# Patient Record
Sex: FEMALE | Race: ASIAN | NOT HISPANIC OR LATINO | URBAN - METROPOLITAN AREA
[De-identification: names, ages, dates, MRNs, and addresses within clinical notes are randomized per-mention and may not be internally consistent; named-entity substitution may affect disease eponyms.]

---

## 2017-03-28 ENCOUNTER — OFFICE (OUTPATIENT)
Dept: URBAN - METROPOLITAN AREA CLINIC 78 | Facility: CLINIC | Age: 72
End: 2017-03-28
Payer: MEDICAID

## 2017-03-28 VITALS
HEART RATE: 89 BPM | TEMPERATURE: 97.9 F | HEIGHT: 63 IN | WEIGHT: 121 LBS | SYSTOLIC BLOOD PRESSURE: 169 MMHG | DIASTOLIC BLOOD PRESSURE: 72 MMHG

## 2017-03-28 DIAGNOSIS — K75.4 AUTOIMMUNE HEPATITIS: ICD-10-CM

## 2017-03-28 DIAGNOSIS — K76.0 FATTY (CHANGE OF) LIVER, NOT ELSEWHERE CLASSIFIED: ICD-10-CM

## 2017-03-28 DIAGNOSIS — K31.89 OTHER DISEASES OF STOMACH AND DUODENUM: ICD-10-CM

## 2017-03-28 DIAGNOSIS — Z12.11 ENCOUNTER FOR SCREENING FOR MALIGNANT NEOPLASM OF COLON: ICD-10-CM

## 2017-03-28 DIAGNOSIS — I85.00 ESOPHAGEAL VARICES WITHOUT BLEEDING: ICD-10-CM

## 2017-03-28 PROCEDURE — 99214 OFFICE O/P EST MOD 30 MIN: CPT

## 2017-03-28 NOTE — SERVICEHPINOTES
Mrs Mullins returns for followup. She has trouble sleeping but not klaus encephalopathic disturbances. She denies abdominal pain. She has been generally well otherwise. Denies any bleeding or jaundice. She has not had a colonoscopy and EGD recently since 2008 revealing portal gastropathy and varices which was banded for bleeding. Repeat EGD and colonoscopy ordered last year but patient did not schedule. Currently on AZA for autoimmune hepatitis and Lactulose 90mL daily and has about 3 bms to 4 BMs per day.   Last US with Doppler in November was stable. Labs from November also stable.    Dr. Garza had also recommended that she see a hepatologist to discuss possible transplant candidacy. Patient states she did this and was not a candidate for transplant (cannot remember who she saw). No other GI related complaints today.

## 2017-04-18 LAB
AFP, SERUM, TUMOR MARKER: 1.4 NG/ML (ref 0–8.3)
AMBIG ABBREV CMP14 DEFAULT: (no result)
AMMONIA, PLASMA: 53 UG/DL (ref 19–87)
CBC WITH DIFFERENTIAL/PLATELET: BASO (ABSOLUTE): 0 X10E3/UL (ref 0–0.2)
CBC WITH DIFFERENTIAL/PLATELET: BASOS: 0 %
CBC WITH DIFFERENTIAL/PLATELET: EOS (ABSOLUTE): 0.1 X10E3/UL (ref 0–0.4)
CBC WITH DIFFERENTIAL/PLATELET: EOS: 4 %
CBC WITH DIFFERENTIAL/PLATELET: HEMATOCRIT: 36.9 % (ref 34–46.6)
CBC WITH DIFFERENTIAL/PLATELET: HEMATOLOGY COMMENTS: (no result)
CBC WITH DIFFERENTIAL/PLATELET: HEMOGLOBIN: 12.6 G/DL (ref 11.1–15.9)
CBC WITH DIFFERENTIAL/PLATELET: IMMATURE CELLS: (no result)
CBC WITH DIFFERENTIAL/PLATELET: IMMATURE GRANS (ABS): 0 X10E3/UL (ref 0–0.1)
CBC WITH DIFFERENTIAL/PLATELET: IMMATURE GRANULOCYTES: 0 %
CBC WITH DIFFERENTIAL/PLATELET: LYMPHS (ABSOLUTE): 0.8 X10E3/UL (ref 0.7–3.1)
CBC WITH DIFFERENTIAL/PLATELET: LYMPHS: 28 %
CBC WITH DIFFERENTIAL/PLATELET: MCH: 35.5 PG — HIGH (ref 26.6–33)
CBC WITH DIFFERENTIAL/PLATELET: MCHC: 34.1 G/DL (ref 31.5–35.7)
CBC WITH DIFFERENTIAL/PLATELET: MCV: 104 FL — HIGH (ref 79–97)
CBC WITH DIFFERENTIAL/PLATELET: MONOCYTES(ABSOLUTE): 0.2 X10E3/UL (ref 0.1–0.9)
CBC WITH DIFFERENTIAL/PLATELET: MONOCYTES: 6 %
CBC WITH DIFFERENTIAL/PLATELET: NEUTROPHILS (ABSOLUTE): 1.7 X10E3/UL (ref 1.4–7)
CBC WITH DIFFERENTIAL/PLATELET: NEUTROPHILS: 62 %
CBC WITH DIFFERENTIAL/PLATELET: NRBC: (no result)
CBC WITH DIFFERENTIAL/PLATELET: PLATELETS: 128 X10E3/UL — LOW (ref 150–379)
CBC WITH DIFFERENTIAL/PLATELET: RBC: 3.55 X10E6/UL — LOW (ref 3.77–5.28)
CBC WITH DIFFERENTIAL/PLATELET: RDW: 16.1 % — HIGH (ref 12.3–15.4)
CBC WITH DIFFERENTIAL/PLATELET: WBC: 2.8 X10E3/UL — LOW (ref 3.4–10.8)
COMP. METABOLIC PANEL (14): A/G RATIO: 1.5 (ref 1.2–2.2)
COMP. METABOLIC PANEL (14): ALBUMIN, SERUM: 3.5 G/DL (ref 3.5–4.8)
COMP. METABOLIC PANEL (14): ALKALINE PHOSPHATASE, S: 113 IU/L (ref 39–117)
COMP. METABOLIC PANEL (14): ALT (SGPT): 11 IU/L (ref 0–32)
COMP. METABOLIC PANEL (14): AST (SGOT): 22 IU/L (ref 0–40)
COMP. METABOLIC PANEL (14): BILIRUBIN, TOTAL: 1.3 MG/DL — HIGH (ref 0–1.2)
COMP. METABOLIC PANEL (14): BUN/CREATININE RATIO: 18 (ref 12–28)
COMP. METABOLIC PANEL (14): BUN: 12 MG/DL (ref 8–27)
COMP. METABOLIC PANEL (14): CALCIUM, SERUM: 9 MG/DL (ref 8.7–10.3)
COMP. METABOLIC PANEL (14): CARBON DIOXIDE, TOTAL: 26 MMOL/L (ref 18–29)
COMP. METABOLIC PANEL (14): CHLORIDE, SERUM: 106 MMOL/L (ref 96–106)
COMP. METABOLIC PANEL (14): CREATININE, SERUM: 0.68 MG/DL (ref 0.57–1)
COMP. METABOLIC PANEL (14): EGFR IF AFRICN AM: 100 ML/MIN/1.73 (ref 59–?)
COMP. METABOLIC PANEL (14): EGFR IF NONAFRICN AM: 86 ML/MIN/1.73 (ref 59–?)
COMP. METABOLIC PANEL (14): GLOBULIN, TOTAL: 2.4 G/DL (ref 1.5–4.5)
COMP. METABOLIC PANEL (14): GLUCOSE, SERUM: 94 MG/DL (ref 65–99)
COMP. METABOLIC PANEL (14): POTASSIUM, SERUM: 4.1 MMOL/L (ref 3.5–5.2)
COMP. METABOLIC PANEL (14): PROTEIN, TOTAL, SERUM: 5.9 G/DL — LOW (ref 6–8.5)
COMP. METABOLIC PANEL (14): SODIUM, SERUM: 145 MMOL/L — HIGH (ref 134–144)
PROTHROMBIN TIME (PT): INR: 1.1 (ref 0.8–1.2)
PROTHROMBIN TIME (PT): PROTHROMBIN TIME: 11.5 SEC (ref 9.1–12)

## 2017-04-25 ENCOUNTER — ON CAMPUS - OUTPATIENT (OUTPATIENT)
Dept: URBAN - METROPOLITAN AREA HOSPITAL 14 | Facility: HOSPITAL | Age: 72
End: 2017-04-25
Payer: MEDICAID

## 2017-04-25 DIAGNOSIS — K63.5 POLYP OF COLON: ICD-10-CM

## 2017-04-25 DIAGNOSIS — Z12.11 ENCOUNTER FOR SCREENING FOR MALIGNANT NEOPLASM OF COLON: ICD-10-CM

## 2017-04-25 PROCEDURE — 45380 COLONOSCOPY AND BIOPSY: CPT

## 2017-05-04 ENCOUNTER — ON CAMPUS - OUTPATIENT (OUTPATIENT)
Dept: URBAN - METROPOLITAN AREA HOSPITAL 14 | Facility: HOSPITAL | Age: 72
End: 2017-05-04
Payer: MEDICAID

## 2017-05-04 DIAGNOSIS — K74.69 OTHER CIRRHOSIS OF LIVER: ICD-10-CM

## 2017-05-04 DIAGNOSIS — K75.4 AUTOIMMUNE HEPATITIS: ICD-10-CM

## 2017-05-04 DIAGNOSIS — K31.89 OTHER DISEASES OF STOMACH AND DUODENUM: ICD-10-CM

## 2017-05-04 PROCEDURE — 43239 EGD BIOPSY SINGLE/MULTIPLE: CPT

## 2018-05-02 ENCOUNTER — OFFICE (OUTPATIENT)
Dept: URBAN - METROPOLITAN AREA CLINIC 78 | Facility: CLINIC | Age: 73
End: 2018-05-02
Payer: MEDICAID

## 2018-05-02 VITALS
HEIGHT: 63 IN | SYSTOLIC BLOOD PRESSURE: 184 MMHG | WEIGHT: 124 LBS | DIASTOLIC BLOOD PRESSURE: 82 MMHG | HEART RATE: 85 BPM | TEMPERATURE: 97.8 F

## 2018-05-02 DIAGNOSIS — I85.00 ESOPHAGEAL VARICES WITHOUT BLEEDING: ICD-10-CM

## 2018-05-02 DIAGNOSIS — K74.60 UNSPECIFIED CIRRHOSIS OF LIVER: ICD-10-CM

## 2018-05-02 DIAGNOSIS — K31.89 OTHER DISEASES OF STOMACH AND DUODENUM: ICD-10-CM

## 2018-05-02 DIAGNOSIS — K75.4 AUTOIMMUNE HEPATITIS: ICD-10-CM

## 2018-05-02 LAB
AFP, SERUM, TUMOR MARKER: 1 NG/ML (ref 0–8.3)
AMMONIA, PLASMA: 45 UG/DL (ref 19–87)

## 2018-05-02 PROCEDURE — 99214 OFFICE O/P EST MOD 30 MIN: CPT

## 2018-05-02 RX ORDER — AZATHIOPRINE 50 MG/1
TABLET ORAL
Qty: 30 | Refills: 12 | Status: COMPLETED
End: 2018-07-25

## 2018-05-02 RX ORDER — LACTULOSE 10 G/15ML
SOLUTION ORAL
Qty: 8100 | Refills: 12 | Status: ACTIVE

## 2018-05-02 RX ORDER — URSODIOL 300 MG/1
CAPSULE ORAL
Qty: 90 | Refills: 0 | Status: COMPLETED
End: 2021-02-10

## 2018-05-02 RX ORDER — PANTOPRAZOLE SODIUM 20 MG/1
TABLET, DELAYED RELEASE ORAL
Qty: 30 | Refills: 0 | Status: COMPLETED
End: 2021-02-10

## 2018-05-02 NOTE — SERVICEHPINOTES
BUDDY WILKES   is a   73  female who presents for cirrhosis f/u.She has trouble sleeping but not klaus encephalopathic disturbances. She denies abdominal pain. She has been generally well otherwise. Denies any bleeding or jaundice. She had a colonoscopy and EGD last year. S/p esophageal banding in 2008. EGD 5/2017 revealed esophageal ring s/p dilation, mild hypertensive portal gatropathy, and normal duodenum. Colonoscopy 4/2017 revealed 2 hyperplastic polyps. BRCurrently on AZA for autoimmune hepatitis and Lactulose 90mL daily and has about 5 BMs daily. Last US with Doppler was in November 2016, it was stable. She has not followed up with a repeat US yet, does not know why. Recent CBC, CMP, and INR were stable (4/27/18), repeat in 6 months? Dr. Garza had also recommended that she see a hepatologist to discuss possible transplant candidacy. Patient states she did this and was not a candidate for transplant (cannot remember who she saw). No other GI related complaints today.

## 2018-07-25 ENCOUNTER — OFFICE (OUTPATIENT)
Dept: URBAN - METROPOLITAN AREA CLINIC 78 | Facility: CLINIC | Age: 73
End: 2018-07-25
Payer: MEDICAID

## 2018-07-25 VITALS
TEMPERATURE: 97.9 F | DIASTOLIC BLOOD PRESSURE: 80 MMHG | WEIGHT: 128 LBS | SYSTOLIC BLOOD PRESSURE: 173 MMHG | HEART RATE: 85 BPM | HEIGHT: 63 IN

## 2018-07-25 DIAGNOSIS — K74.60 UNSPECIFIED CIRRHOSIS OF LIVER: ICD-10-CM

## 2018-07-25 DIAGNOSIS — I85.00 ESOPHAGEAL VARICES WITHOUT BLEEDING: ICD-10-CM

## 2018-07-25 DIAGNOSIS — K75.4 AUTOIMMUNE HEPATITIS: ICD-10-CM

## 2018-07-25 DIAGNOSIS — K31.89 OTHER DISEASES OF STOMACH AND DUODENUM: ICD-10-CM

## 2018-07-25 PROCEDURE — 99214 OFFICE O/P EST MOD 30 MIN: CPT

## 2018-07-25 NOTE — SERVICEHPINOTES
BUDDY WILKES   is a   73  female who presents for f/u of US with Doppler. US with Doppler done on 7/3/18 revealed decreased velocities within the TIPS stent compatible with mild-moderate stenosis although still patent. BRToday, her only complaint is difficulty sleeping and she plans to f/u with PCP about this. No abdominal pain, weight gain. Weight has been pretty stable. No other issues.

## 2021-02-10 ENCOUNTER — OFFICE (OUTPATIENT)
Dept: URBAN - METROPOLITAN AREA CLINIC 79 | Facility: CLINIC | Age: 76
End: 2021-02-10
Payer: MEDICAID

## 2021-02-10 VITALS
HEIGHT: 63 IN | DIASTOLIC BLOOD PRESSURE: 61 MMHG | HEART RATE: 95 BPM | TEMPERATURE: 97 F | SYSTOLIC BLOOD PRESSURE: 165 MMHG | WEIGHT: 124 LBS

## 2021-02-10 DIAGNOSIS — K75.4 AUTOIMMUNE HEPATITIS: ICD-10-CM

## 2021-02-10 DIAGNOSIS — K74.60 UNSPECIFIED CIRRHOSIS OF LIVER: ICD-10-CM

## 2021-02-10 PROCEDURE — 99214 OFFICE O/P EST MOD 30 MIN: CPT | Performed by: PHYSICIAN ASSISTANT

## 2021-02-10 NOTE — SERVICEHPINOTES
74 yo female presents with her son for f/u AIH, cirrhosis. She is s/p TIPS in remote past. Hasn't been seen by myself for many years and hasn't been seen here since 2018 due to insurance challenges. She has, however, been continuing to get labs and U/S with TIPS assessment q 6 months and will be going for both of these items within the next week. Patient's main concern today is regarding whether to get the COVID vaccine or not. She is otherwise doing well overall. Denies any problems with confusion or memory loss. Has occasional LE edema if she's on her feet a lot. Denies abdominal swelling. She continues on Imuran 50mg and lactulose 30ml BID and has 2-3 BMs daily. Gets her meds filled by PCP. She has no GI complaints or constitutional sx. She takes Protonix 20 mg for heartburn with good control of symptoms. Last EGD and colonoscopy were in 2017 with benign findings and no recall advised based on age. No esophageal varices. 8/14/20 AFP 0.9 ng/ml nl WBC 3.0, hgb 11.6/hct 34.1, PLT 91 ALT 14/AST 33, total bili 1.40 PT 14.7, INR 1.23/7/14 Hgb 12.9, plt 96, creat 0.45, bili 2.0, alb 4.0, AST/ALT 29/14, AFP 1.1, INR 1.1BR2/26/13 WBC 2.7, Hgb 13.0, plt 102, AFP 0.9, creat 0.48, bili 1.8, alb 4.0, AST/ALT 30/16, INR 1.1BR3/29/12 WBC 2.9, H/H 11.4/33.3, plt 76, ANC 1.9 (nl) INR 1.2, bili 2.1 (direct 0.5), AST/ALT 31/34, AFP 1.0BR3/5/10 WBC 3.1, H/H 11.1/31.1, plt 61, INR 1.2, AST/ALT 27/31

## 2022-07-27 ENCOUNTER — OFFICE (OUTPATIENT)
Dept: URBAN - METROPOLITAN AREA CLINIC 79 | Facility: CLINIC | Age: 77
End: 2022-07-27
Payer: MEDICAID

## 2022-07-27 VITALS
TEMPERATURE: 97.7 F | WEIGHT: 115 LBS | SYSTOLIC BLOOD PRESSURE: 148 MMHG | HEART RATE: 85 BPM | HEIGHT: 63 IN | DIASTOLIC BLOOD PRESSURE: 61 MMHG

## 2022-07-27 DIAGNOSIS — K74.60 UNSPECIFIED CIRRHOSIS OF LIVER: ICD-10-CM

## 2022-07-27 DIAGNOSIS — K75.4 AUTOIMMUNE HEPATITIS: ICD-10-CM

## 2022-07-27 PROCEDURE — 99214 OFFICE O/P EST MOD 30 MIN: CPT | Performed by: PHYSICIAN ASSISTANT

## 2022-07-27 NOTE — SERVICEHPINOTES
78 yo female presents with her son for f/u AIH, cirrhosis. She is s/p TIPS in remote past (2008). Her liver labs have been generally stable. Hgb level in April was lower along with WBCs and she was advised to see hematology but [place] so didn't see hematology (has seen in past) and she had an updated CBC with PCP just last month which is back to her baseline. Reports that she had had some changes in meds while [place] the [place]. No GI bleeding symptoms and she feels well overall and has no complaints today. br
arieDenies any problems with confusion or memory loss. Has occasional LE edema if she's on her feet a lot. Denies abdominal swelling. She continues on Imuran 50mg and lactulose 30ml BID and has 2-3 BMs daily. Gets her meds filled by PCP. She has no GI complaints or constitutional sx. She takes Protonix 20 mg for heartburn with good control of symptoms. Last EGD and colonoscopy were in 2017 with benign findings and no recall advised based on age. No esophageal varices.arie

## 2024-05-29 ENCOUNTER — OFFICE (OUTPATIENT)
Dept: URBAN - METROPOLITAN AREA CLINIC 79 | Facility: CLINIC | Age: 79
End: 2024-05-29
Payer: MEDICAID

## 2024-05-29 VITALS
DIASTOLIC BLOOD PRESSURE: 62 MMHG | WEIGHT: 112 LBS | TEMPERATURE: 97.8 F | SYSTOLIC BLOOD PRESSURE: 155 MMHG | HEIGHT: 63 IN | HEART RATE: 79 BPM

## 2024-05-29 DIAGNOSIS — K74.60 UNSPECIFIED CIRRHOSIS OF LIVER: ICD-10-CM

## 2024-05-29 DIAGNOSIS — K75.4 AUTOIMMUNE HEPATITIS: ICD-10-CM

## 2024-05-29 DIAGNOSIS — Z95.828 PRESENCE OF OTHER VASCULAR IMPLANTS AND GRAFTS: ICD-10-CM

## 2024-05-29 PROCEDURE — 99214 OFFICE O/P EST MOD 30 MIN: CPT | Performed by: PHYSICIAN ASSISTANT

## 2024-05-29 RX ORDER — AZATHIOPRINE 50 1/1
TABLET ORAL
Qty: 90 | Refills: 3 | Status: ACTIVE

## 2024-06-06 LAB
COMP. METABOLIC PANEL (14): A/G RATIO: 1.7 (ref 1.2–2.2)
COMP. METABOLIC PANEL (14): ALBUMIN: 4 G/DL (ref 3.7–4.7)
COMP. METABOLIC PANEL (14): ALKALINE PHOSPHATASE: 104 IU/L (ref 44–121)
COMP. METABOLIC PANEL (14): ALT (SGPT): 12 IU/L (ref 0–32)
COMP. METABOLIC PANEL (14): AST (SGOT): 25 IU/L (ref 0–40)
COMP. METABOLIC PANEL (14): BILIRUBIN, TOTAL: 1.7 MG/DL — HIGH (ref 0–1.2)
COMP. METABOLIC PANEL (14): BUN/CREATININE RATIO: 25 (ref 12–28)
COMP. METABOLIC PANEL (14): BUN: 23 MG/DL (ref 8–27)
COMP. METABOLIC PANEL (14): CALCIUM: 9.7 MG/DL (ref 8.7–10.3)
COMP. METABOLIC PANEL (14): CARBON DIOXIDE, TOTAL: 24 MMOL/L (ref 20–29)
COMP. METABOLIC PANEL (14): CHLORIDE: 105 MMOL/L (ref 96–106)
COMP. METABOLIC PANEL (14): CREATININE: 0.91 MG/DL (ref 0.57–1)
COMP. METABOLIC PANEL (14): EGFR: 63 ML/MIN/1.73 (ref 59–?)
COMP. METABOLIC PANEL (14): GLOBULIN, TOTAL: 2.4 G/DL (ref 1.5–4.5)
COMP. METABOLIC PANEL (14): GLUCOSE: 97 MG/DL (ref 70–99)
COMP. METABOLIC PANEL (14): POTASSIUM: 4.4 MMOL/L (ref 3.5–5.2)
COMP. METABOLIC PANEL (14): PROTEIN, TOTAL: 6.4 G/DL (ref 6–8.5)
COMP. METABOLIC PANEL (14): SODIUM: 140 MMOL/L (ref 134–144)

## 2025-06-18 ENCOUNTER — OFFICE (OUTPATIENT)
Dept: URBAN - METROPOLITAN AREA CLINIC 79 | Facility: CLINIC | Age: 80
End: 2025-06-18
Payer: MEDICAID

## 2025-06-18 VITALS
WEIGHT: 105 LBS | SYSTOLIC BLOOD PRESSURE: 152 MMHG | DIASTOLIC BLOOD PRESSURE: 65 MMHG | HEIGHT: 63 IN | DIASTOLIC BLOOD PRESSURE: 66 MMHG | SYSTOLIC BLOOD PRESSURE: 145 MMHG | HEART RATE: 79 BPM | TEMPERATURE: 97.4 F

## 2025-06-18 DIAGNOSIS — K75.4 AUTOIMMUNE HEPATITIS: ICD-10-CM

## 2025-06-18 DIAGNOSIS — Z95.828 PRESENCE OF OTHER VASCULAR IMPLANTS AND GRAFTS: ICD-10-CM

## 2025-06-18 DIAGNOSIS — K74.60 UNSPECIFIED CIRRHOSIS OF LIVER: ICD-10-CM

## 2025-06-18 PROCEDURE — 99214 OFFICE O/P EST MOD 30 MIN: CPT | Performed by: PHYSICIAN ASSISTANT
